# Patient Record
Sex: MALE | Race: WHITE | Employment: FULL TIME | URBAN - METROPOLITAN AREA
[De-identification: names, ages, dates, MRNs, and addresses within clinical notes are randomized per-mention and may not be internally consistent; named-entity substitution may affect disease eponyms.]

---

## 2019-10-09 ENCOUNTER — OFFICE VISIT (OUTPATIENT)
Dept: FAMILY MEDICINE CLINIC | Facility: CLINIC | Age: 34
End: 2019-10-09
Payer: COMMERCIAL

## 2019-10-09 VITALS
BODY MASS INDEX: 21.93 KG/M2 | OXYGEN SATURATION: 100 % | HEIGHT: 70.75 IN | DIASTOLIC BLOOD PRESSURE: 70 MMHG | TEMPERATURE: 98 F | HEART RATE: 68 BPM | SYSTOLIC BLOOD PRESSURE: 105 MMHG | WEIGHT: 156.63 LBS

## 2019-10-09 DIAGNOSIS — N50.812 TESTICULAR PAIN, LEFT: ICD-10-CM

## 2019-10-09 DIAGNOSIS — R10.32 LEFT INGUINAL PAIN: ICD-10-CM

## 2019-10-09 DIAGNOSIS — Z00.00 ROUTINE ADULT HEALTH MAINTENANCE: Primary | ICD-10-CM

## 2019-10-09 PROCEDURE — 99202 OFFICE O/P NEW SF 15 MIN: CPT | Performed by: FAMILY MEDICINE

## 2019-10-09 PROCEDURE — 99385 PREV VISIT NEW AGE 18-39: CPT | Performed by: FAMILY MEDICINE

## 2019-10-09 NOTE — PROGRESS NOTES
Yvette Levy is a 35year old male who presents for a complete physical exam.   HPI:   Pt complains of having a possible inguinal hernia on the left side.   He has had this pain off and on for years and he has some pain behind the left testicle as well no Temp 98.4 °F (36.9 °C) (Temporal)   Ht 70.75\"   Wt 156 lb 9.6 oz (71 kg)   SpO2 100%   BMI 22.00 kg/m²   Body mass index is 22 kg/m².    GENERAL: NAD, pleasant, well developed, normal voice  SKIN: no rashes, no suspicious moles or lesions  HENT: NCAT, EACs however patient should have ultrasound done of the inguinal region and left testicle to evaluate further. Possible groin strain versus hernia versus epididymitis. Recommend healthy diet including green leafy vegetables, fresh fruits and lean meats.

## 2019-10-16 ENCOUNTER — HOSPITAL ENCOUNTER (OUTPATIENT)
Dept: ULTRASOUND IMAGING | Age: 34
Discharge: HOME | End: 2019-10-16
Attending: FAMILY MEDICINE
Payer: COMMERCIAL

## 2019-10-16 ENCOUNTER — APPOINTMENT (OUTPATIENT)
Dept: LAB | Age: 34
End: 2019-10-16
Attending: FAMILY MEDICINE
Payer: COMMERCIAL

## 2019-10-16 DIAGNOSIS — R10.32 LEFT INGUINAL PAIN: ICD-10-CM

## 2019-10-16 DIAGNOSIS — Z00.00 ROUTINE ADULT HEALTH MAINTENANCE: ICD-10-CM

## 2019-10-16 DIAGNOSIS — N50.812 TESTICULAR PAIN, LEFT: ICD-10-CM

## 2019-10-16 PROCEDURE — 93975 VASCULAR STUDY: CPT | Performed by: FAMILY MEDICINE

## 2019-10-16 PROCEDURE — 36415 COLL VENOUS BLD VENIPUNCTURE: CPT | Performed by: FAMILY MEDICINE

## 2019-10-16 PROCEDURE — 76870 US EXAM SCROTUM: CPT | Performed by: FAMILY MEDICINE

## 2019-10-16 PROCEDURE — 80053 COMPREHEN METABOLIC PANEL: CPT | Performed by: FAMILY MEDICINE

## 2019-10-16 PROCEDURE — 85027 COMPLETE CBC AUTOMATED: CPT | Performed by: FAMILY MEDICINE

## 2019-10-16 PROCEDURE — 80061 LIPID PANEL: CPT | Performed by: FAMILY MEDICINE

## 2019-10-29 ENCOUNTER — OFFICE VISIT (OUTPATIENT)
Dept: SURGERY | Facility: CLINIC | Age: 34
End: 2019-10-29
Payer: COMMERCIAL

## 2019-10-29 VITALS
HEART RATE: 56 BPM | BODY MASS INDEX: 23.1 KG/M2 | SYSTOLIC BLOOD PRESSURE: 126 MMHG | TEMPERATURE: 98 F | DIASTOLIC BLOOD PRESSURE: 83 MMHG | HEIGHT: 70.75 IN | WEIGHT: 165 LBS

## 2019-10-29 DIAGNOSIS — R10.32 LEFT INGUINAL PAIN: Primary | ICD-10-CM

## 2019-10-29 PROCEDURE — 99243 OFF/OP CNSLTJ NEW/EST LOW 30: CPT | Performed by: SURGERY

## 2019-10-29 NOTE — H&P
New Patient Visit Note       Active Problems      1.  Left inguinal pain        Chief Complaint   Patient presents with:  Hernia: NP referred by Dr. Ty Merrill for possible hernia, c/o left side inguinal pain 3/10, noticed about a month ago worse when active, d Frequency: 2-3 times a week        Drinks per session: 3 or 4        Binge frequency: Less than monthly      Drug use: Never     No current outpatient medications on file.       Review of Systems  The Review of Systems has been reviewed by me during toda no tenderness at McBurney's point and negative Antonio's sign. No hernia. Hernia confirmed negative in the ventral area, confirmed negative in the right inguinal area and confirmed negative in the left inguinal area.    Genitourinary:    Testes and penis nor worsen or fail to improve.     Donell Braswell MD

## 2019-10-29 NOTE — PATIENT INSTRUCTIONS
Groin Strain (Adult)  A groin strain is a stretching or partial tearing of the muscle in the lower belly (abdomen) or upper thigh. This may happen because of too much coughing, heavy lifting, or active sports.  The pain may last for several days or weeks, © 0480-1610 [employer] [address] [address] [facility], [facility] [place]. All rights reserved. This information is not intended as a substitute for professional medical care. Always follow your healthcare professional's instructions.

## 2022-10-12 ENCOUNTER — APPOINTMENT (OUTPATIENT)
Dept: GENERAL RADIOLOGY | Age: 37
End: 2022-10-12
Attending: EMERGENCY MEDICINE
Payer: COMMERCIAL

## 2022-10-12 PROCEDURE — 71046 X-RAY EXAM CHEST 2 VIEWS: CPT | Performed by: EMERGENCY MEDICINE

## (undated) NOTE — LETTER
10/29/19    Patient: Michi Daly  : 10/22/1985 Visit date: 10/29/2019    Dear  Dr. Gonzalez Abbasi MD,    Thank you for referring Michi Daly to my practice. Please find my assessment and plan below.              Assessment   Left inguinal pain  (primary en